# Patient Record
Sex: FEMALE | Race: WHITE | NOT HISPANIC OR LATINO | Employment: OTHER | URBAN - METROPOLITAN AREA
[De-identification: names, ages, dates, MRNs, and addresses within clinical notes are randomized per-mention and may not be internally consistent; named-entity substitution may affect disease eponyms.]

---

## 2017-06-26 ENCOUNTER — PATIENT OUTREACH (OUTPATIENT)
Dept: HEALTH INFORMATION MANAGEMENT | Facility: OTHER | Age: 82
End: 2017-06-26

## 2017-06-26 NOTE — PROGRESS NOTES
Outcome: WRONG NUMBER    WebIZ Checked & Epic Updated:  yes    [facility] Verified: yes    Attempt # 1ST

## 2017-07-06 NOTE — PROGRESS NOTES
Outcome: wrong number    WebIZ Checked & Epic Updated:  yes    [facility] Verified: yes    Attempt # 2nd

## 2017-09-12 ENCOUNTER — OFFICE VISIT (OUTPATIENT)
Dept: MEDICAL GROUP | Facility: PHYSICIAN GROUP | Age: 82
End: 2017-09-12
Payer: MEDICARE

## 2017-09-12 ENCOUNTER — TELEPHONE (OUTPATIENT)
Dept: MEDICAL GROUP | Facility: PHYSICIAN GROUP | Age: 82
End: 2017-09-12

## 2017-09-12 VITALS
HEART RATE: 75 BPM | OXYGEN SATURATION: 94 % | SYSTOLIC BLOOD PRESSURE: 120 MMHG | DIASTOLIC BLOOD PRESSURE: 72 MMHG | HEIGHT: 60 IN | BODY MASS INDEX: 30.94 KG/M2 | TEMPERATURE: 98.5 F | RESPIRATION RATE: 18 BRPM | WEIGHT: 157.6 LBS

## 2017-09-12 DIAGNOSIS — Z12.39 BREAST CANCER SCREENING: ICD-10-CM

## 2017-09-12 DIAGNOSIS — Z12.11 COLON CANCER SCREENING: ICD-10-CM

## 2017-09-12 DIAGNOSIS — R23.8 SKIN IRRITATION: ICD-10-CM

## 2017-09-12 DIAGNOSIS — E03.9 HYPOTHYROIDISM (ACQUIRED): ICD-10-CM

## 2017-09-12 DIAGNOSIS — E78.2 MIXED DYSLIPIDEMIA: ICD-10-CM

## 2017-09-12 PROCEDURE — 99214 OFFICE O/P EST MOD 30 MIN: CPT | Performed by: FAMILY MEDICINE

## 2017-09-12 RX ORDER — LIDOCAINE 40 MG/G
1 CREAM TOPICAL ONCE
Qty: 30 G | Refills: 2 | Status: SHIPPED | OUTPATIENT
Start: 2017-09-12 | End: 2017-09-12

## 2017-09-12 ASSESSMENT — PATIENT HEALTH QUESTIONNAIRE - PHQ9: CLINICAL INTERPRETATION OF PHQ2 SCORE: 0

## 2017-09-12 NOTE — ASSESSMENT & PLAN NOTE
2 weeks of intermittent burning pain around left nipple.   She had shingles in same area many years ago. 25 yrs ago  No change in intensity. She notices the small bumps around her nipple on the areola get inflamed, tender and then improve.     No blistering of skin  Will try a topical analgesic    if no imp will try gabapentin.   Has been improving.

## 2017-09-12 NOTE — PROGRESS NOTES
"Subjective:   Mela Obrien is a 82 y.o. female here today for evaluation and management of:     Mixed dyslipidemia  Recheck labs  Continue with  Healthy diet and exercise.   She has an [place] smallwood and a border collie 16 yrs old. She walks them twice a day.     Hypothyroidism (acquired)  Stable on levothyroxine 50 mg  TSH last year normal  Will recheck labs.       Skin irritation  2 weeks of intermittent burning pain around left nipple.   She had shingles in same area many years ago. 25 yrs ago  No change in intensity. She notices the small bumps around her nipple on the areola get inflamed, tender and then improve.     No blistering of skin  Will try a topical analgesic    if no imp will try gabapentin.            Current medicines (including changes today)  Current Outpatient Prescriptions   Medication Sig Dispense Refill   • lidocaine (LMX) 4 % Cream Apply 1 g to affected area(s) Once for 1 dose. 30 g 2   • aspirin 81 MG EC tablet Take  by mouth.     • levothyroxine (SYNTHROID) 50 MCG Tab Take 1 Tab by mouth Every morning on an empty stomach. 90 Tab 3   • Cholecalciferol (VITAMIN D) 2000 UNIT Tab Take 1 Tab by mouth every day. 90 Tab 0     No current facility-administered medications for this visit.      She  has a past medical history of Cataract and TIA (transient ischemic attack).    ROS  No chest pain, no shortness of breath, no abdominal pain       Objective:     Blood pressure 120/72, pulse 75, temperature 36.9 °C (98.5 °F), resp. rate 18, height 1.511 m (4' 11.5\"), weight 71.5 kg (157 lb 9.6 oz), SpO2 94 %. Body mass index is 31.3 kg/m².   Physical Exam:  Constitutional: Alert, no distress.  Skin: Warm, dry, good turgor, no rashes in visible areas. Mild erythema on skin around left nipple no ulceration, no blisters, mildly TTP.   Eye: Equal, round and reactive, conjunctiva clear, lids normal.  ENMT: Lips without lesions, good dentition, oropharynx clear.  Neck: Trachea midline, no masses, no " thyromegaly. No cervical or supraclavicular lymphadenopathy  Respiratory: Unlabored respiratory effort, lungs clear to auscultation, no wheezes, no ronchi.  Cardiovascular: Normal S1, S2, no murmur, no edema.  Abdomen: Soft, non-tender, no masses, no hepatosplenomegaly.  Psych: Alert and oriented x3, normal affect and mood.        Assessment and Plan:   The following treatment plan was discussed    1. Mixed dyslipidemia  Continue with healthy die and activity  Recheck labs  - LIPID PROFILE; Future    2. Hypothyroidism (acquired)  Stable, controlled on levothyroxine 50mcg  - TSH WITH REFLEX TO FT4; Future    3. Skin irritation  Trial topical lidocaine.   Check mammo,   Monitor closely for worsening symptoms.   - COMP METABOLIC PANEL; Future  - lidocaine (LMX) 4 % Cream; Apply 1 g to affected area(s) Once for 1 dose.  Dispense: 30 g; Refill: 2    4. Breast cancer screening  - MA-SCREEN MAMMO W/CAD-BILAT    5. Colon cancer screening  - OCCULT BLOOD FECES IMMUNOASSAY (FIT); Future      Followup: No Follow-up on file.

## 2017-09-12 NOTE — ASSESSMENT & PLAN NOTE
Recheck labs  Continue with  Healthy diet and exercise.   She has an [place] smallwood and a border collie 16 yrs old. She walks them twice a day.

## 2017-09-14 ENCOUNTER — HOSPITAL ENCOUNTER (OUTPATIENT)
Facility: MEDICAL CENTER | Age: 82
End: 2017-09-14
Attending: FAMILY MEDICINE
Payer: MEDICARE

## 2017-09-14 PROCEDURE — 82274 ASSAY TEST FOR BLOOD FECAL: CPT

## 2017-09-20 DIAGNOSIS — Z12.11 COLON CANCER SCREENING: ICD-10-CM

## 2017-09-20 LAB — HEMOCCULT STL QL IA: NEGATIVE

## 2017-10-10 ENCOUNTER — TELEPHONE (OUTPATIENT)
Dept: MEDICAL GROUP | Facility: PHYSICIAN GROUP | Age: 82
End: 2017-10-10

## 2017-10-12 ENCOUNTER — HOSPITAL ENCOUNTER (OUTPATIENT)
Dept: LAB | Facility: MEDICAL CENTER | Age: 82
End: 2017-10-12
Attending: FAMILY MEDICINE
Payer: MEDICARE

## 2017-10-12 DIAGNOSIS — E78.2 MIXED DYSLIPIDEMIA: ICD-10-CM

## 2017-10-12 DIAGNOSIS — E03.9 HYPOTHYROIDISM (ACQUIRED): ICD-10-CM

## 2017-10-12 DIAGNOSIS — R23.8 SKIN IRRITATION: ICD-10-CM

## 2017-10-12 LAB
ALBUMIN SERPL BCP-MCNC: 4 G/DL (ref 3.2–4.9)
ALBUMIN/GLOB SERPL: 1.4 G/DL
ALP SERPL-CCNC: 53 U/L (ref 30–99)
ALT SERPL-CCNC: 14 U/L (ref 2–50)
ANION GAP SERPL CALC-SCNC: 6 MMOL/L (ref 0–11.9)
AST SERPL-CCNC: 19 U/L (ref 12–45)
BILIRUB SERPL-MCNC: 0.4 MG/DL (ref 0.1–1.5)
BUN SERPL-MCNC: 18 MG/DL (ref 8–22)
CALCIUM SERPL-MCNC: 9 MG/DL (ref 8.5–10.5)
CHLORIDE SERPL-SCNC: 110 MMOL/L (ref 96–112)
CHOLEST SERPL-MCNC: 187 MG/DL (ref 100–199)
CO2 SERPL-SCNC: 28 MMOL/L (ref 20–33)
CREAT SERPL-MCNC: 0.83 MG/DL (ref 0.5–1.4)
GFR SERPL CREATININE-BSD FRML MDRD: >60 ML/MIN/1.73 M 2
GLOBULIN SER CALC-MCNC: 2.9 G/DL (ref 1.9–3.5)
GLUCOSE SERPL-MCNC: 76 MG/DL (ref 65–99)
HDLC SERPL-MCNC: 33 MG/DL
LDLC SERPL CALC-MCNC: 111 MG/DL
POTASSIUM SERPL-SCNC: 4.3 MMOL/L (ref 3.6–5.5)
PROT SERPL-MCNC: 6.9 G/DL (ref 6–8.2)
SODIUM SERPL-SCNC: 144 MMOL/L (ref 135–145)
T4 FREE SERPL-MCNC: 0.67 NG/DL (ref 0.53–1.43)
TRIGL SERPL-MCNC: 217 MG/DL (ref 0–149)
TSH SERPL DL<=0.005 MIU/L-ACNC: 14.13 UIU/ML (ref 0.3–3.7)

## 2017-10-12 PROCEDURE — 84443 ASSAY THYROID STIM HORMONE: CPT

## 2017-10-12 PROCEDURE — 80053 COMPREHEN METABOLIC PANEL: CPT

## 2017-10-12 PROCEDURE — 84439 ASSAY OF FREE THYROXINE: CPT

## 2017-10-12 PROCEDURE — 80061 LIPID PANEL: CPT

## 2017-10-12 PROCEDURE — 36415 COLL VENOUS BLD VENIPUNCTURE: CPT

## 2017-10-13 DIAGNOSIS — E03.9 HYPOTHYROIDISM: ICD-10-CM

## 2017-10-13 NOTE — TELEPHONE ENCOUNTER
Was the patient seen in the last year in this department? Yes     Does patient have an active prescription for medications requested? No     Received Request Via: Pharmacy      Pt met protocol?: Yes Last OV 09/2017  TSH   Date Value Ref Range Status   10/12/2017 14.130 (H) 0.300 - 3.700 uIU/mL Final

## 2017-10-16 RX ORDER — LEVOTHYROXINE SODIUM 0.05 MG/1
TABLET ORAL
Qty: 90 TAB | Refills: 1 | Status: SHIPPED | OUTPATIENT
Start: 2017-10-16 | End: 2018-04-10 | Stop reason: SDUPTHER

## 2017-10-16 NOTE — TELEPHONE ENCOUNTER
Please ask Mela if she has been taking her thyroid medication regularly, if so we need to increase the dose as TSH is elevated. I've done a refill on levothyroxine 50 mcg.   If she had run out of the meds needs to stay on the same dose 50 mcg and then recheck lab in 2 months.   I need to know whether she was on the levothyroxine 50 mcg regularly when the lab was done on October 12th.   Thank you.   Eleni Garcia M.D.

## 2017-10-17 ENCOUNTER — TELEPHONE (OUTPATIENT)
Dept: MEDICAL GROUP | Facility: PHYSICIAN GROUP | Age: 82
End: 2017-10-17

## 2017-10-17 NOTE — TELEPHONE ENCOUNTER
----- Message from Yris Swift sent at 10/16/2017  4:08 PM PDT -----  Regarding: Refill on Thyroid Medication   Contact: [phone]  Patient called in stating that she ran out of her Thyroid medication last Friday and would like a refill. Please call patient to advise.    Thank you

## 2017-11-01 ENCOUNTER — OFFICE VISIT (OUTPATIENT)
Dept: MEDICAL GROUP | Facility: PHYSICIAN GROUP | Age: 82
End: 2017-11-01
Payer: MEDICARE

## 2017-11-01 VITALS
BODY MASS INDEX: 31.41 KG/M2 | SYSTOLIC BLOOD PRESSURE: 132 MMHG | RESPIRATION RATE: 20 BRPM | OXYGEN SATURATION: 95 % | HEART RATE: 76 BPM | WEIGHT: 155.8 LBS | HEIGHT: 59 IN | DIASTOLIC BLOOD PRESSURE: 72 MMHG | TEMPERATURE: 98.1 F

## 2017-11-01 DIAGNOSIS — L57.0 ACTINIC KERATOSIS DUE TO EXPOSURE TO SUNLIGHT: ICD-10-CM

## 2017-11-01 DIAGNOSIS — R23.8 SKIN IRRITATION: ICD-10-CM

## 2017-11-01 DIAGNOSIS — E78.2 MIXED DYSLIPIDEMIA: ICD-10-CM

## 2017-11-01 DIAGNOSIS — E03.9 HYPOTHYROIDISM (ACQUIRED): ICD-10-CM

## 2017-11-01 PROCEDURE — G0439 PPPS, SUBSEQ VISIT: HCPCS | Performed by: FAMILY MEDICINE

## 2017-11-01 NOTE — ASSESSMENT & PLAN NOTE
TSH is elevated she is on levothyroxine 50 mcg. She was out of medication for 11 days. Will just recheck labs with no dose changes.

## 2017-11-01 NOTE — ASSESSMENT & PLAN NOTE
No changes in lipid panel  She has never had a heart attack or a stroke, had a TIA 35 years ago. She has never been on a statin  She does not smoke.   TC is normal LDL mildly elevated at 111 with no changes over one year.   Patient is 82 years old. Advised on diet changes will monitor, will not start statin now.

## 2017-11-03 NOTE — PROGRESS NOTES
"Subjective:   Mela Obrien is a 82 y.o. female here today for evaluation and management of:     Actinic keratosis due to exposure to sunlight  No skin changes.     Skin irritation  The irritation around her nipple resolved with OTC ointment.   Mammogram this year is normal.     Mixed dyslipidemia  No changes in lipid panel  She has never had a heart attack or a stroke, had a TIA 35 years ago. She has never been on a statin  She does not smoke.   TC is normal LDL mildly elevated at 111 with no changes over one year.   Patient is 82 years old. Advised on diet changes will monitor, will not start statin now.     Hypothyroidism (acquired)  TSH is elevated she is on levothyroxine 50 mcg. She was out of medication for 11 days. Will just recheck labs with no dose changes.            Current medicines (including changes today)  Current Outpatient Prescriptions   Medication Sig Dispense Refill   • aspirin 81 MG EC tablet Take  by mouth.     • levothyroxine (SYNTHROID) 50 MCG Tab TAKE ONE TABLET BY MOUTH ONCE DAILY IN THE MORNING ON AN EMPTY STOMACH 90 Tab 1     No current facility-administered medications for this visit.      She  has a past medical history of Cataract and TIA (transient ischemic attack).    ROS  No chest pain, no shortness of breath, no abdominal pain       Objective:     Blood pressure 132/72, pulse 76, temperature 36.7 °C (98.1 °F), resp. rate 20, height 1.499 m (4' 11\"), weight 70.7 kg (155 lb 12.8 oz), SpO2 95 %. Body mass index is 31.47 kg/m².   Physical Exam:  Constitutional: Alert, no distress.  Skin: Warm, dry, good turgor, no rashes in visible areas.  Eye: Equal, round and reactive, conjunctiva clear, lids normal.  ENMT: Lips without lesions, good dentition, oropharynx clear.  Neck: Trachea midline, no masses, no thyromegaly. No cervical or supraclavicular lymphadenopathy  Respiratory: Unlabored respiratory effort, lungs clear to auscultation, no wheezes, no ronchi.  Cardiovascular: Normal S1, " S2, no murmur, no edema.  Abdomen: Soft, non-tender, no masses, no hepatosplenomegaly.  Psych: Alert and oriented x3, normal affect and mood.        Assessment and Plan:   The following treatment plan was discussed    1. Actinic keratosis due to exposure to sunlight  No skin change, continue to monitor.     2. Skin irritation  Resolved.     3. Mixed dyslipidemia  Stable. Continue with diet management.     4. Hypothyroidism (acquired)  TSH elevated but she was out of medication. Continue 55mcg and recheck lab.   - TSH WITH REFLEX TO FT4; Future      Followup: Return in about 6 months (around 5/1/2018) for abnormal TSH, follow up labs. .

## 2018-04-10 RX ORDER — LEVOTHYROXINE SODIUM 0.05 MG/1
TABLET ORAL
Qty: 90 TAB | Refills: 1 | Status: SHIPPED | OUTPATIENT
Start: 2018-04-10 | End: 2018-10-01 | Stop reason: SDUPTHER

## 2018-04-25 ENCOUNTER — HOSPITAL ENCOUNTER (OUTPATIENT)
Dept: LAB | Facility: MEDICAL CENTER | Age: 83
End: 2018-04-25
Attending: FAMILY MEDICINE
Payer: MEDICARE

## 2018-04-25 DIAGNOSIS — E03.9 HYPOTHYROIDISM (ACQUIRED): ICD-10-CM

## 2018-04-25 LAB — TSH SERPL DL<=0.005 MIU/L-ACNC: 3.53 UIU/ML (ref 0.38–5.33)

## 2018-04-25 PROCEDURE — 36415 COLL VENOUS BLD VENIPUNCTURE: CPT

## 2018-04-25 PROCEDURE — 84443 ASSAY THYROID STIM HORMONE: CPT

## 2018-05-30 NOTE — PROGRESS NOTES
1. Attempt #: 1    2. [facility] Verified: yes    3. Verify PCP: yes    4. Care Team Updated:       •   DME Company (gait device, O2, CPAP, etc.): YES       •   Other Specialists (eye doctor, derm, GYN, cardiology, endo, etc): YES    5.  Reviewed/Updated the following with patient:       •   Communication Preference Obtained? YES       •   Preferred Pharmacy? YES       •   Preferred Lab? YES       •   Family History (document living status of immediate family members and if + hx of cancer, diabetes, hypertension, hyperlipidemia, heart attack, stroke) YES. Was Abstract Encounter opened and chart updated? YES    6. [facility] [employer] Activation: declined    7. [facility] Deandre: no    8. Annual Wellness Visit Scheduling  Scheduling Status:Scheduled      9. Care Gap Scheduling (Attempt to Schedule EACH Overdue Care Gap!)     Health Maintenance Due   Topic Date Due   • Annual Wellness Visit  1935   • IMM DTaP/Tdap/Td Vaccine (1 - Tdap) 07/30/1956   • PAP SMEAR  07/30/1958   • COLONOSCOPY  07/30/1987   • BONE DENSITY  07/30/2002   • IMM PNEUMOCOCCAL 65+ (ADULT) LOW/MEDIUM RISK SERIES (1 of 2 - PCV13) 07/30/2002        Scheduled patient for Annual Wellness Visit    10. Patient was advised: “This is a free wellness visit. The provider will screen for medical conditions to help you stay healthy. If you have other concerns to address you may be asked to discuss these at a separate visit or there may be an additional fee.”     11. Patient was informed to arrive 15 min prior to their scheduled appointment and bring in their medication bottles.

## 2018-10-01 ENCOUNTER — OFFICE VISIT (OUTPATIENT)
Dept: MEDICAL GROUP | Facility: PHYSICIAN GROUP | Age: 83
End: 2018-10-01
Payer: MEDICARE

## 2018-10-01 ENCOUNTER — APPOINTMENT (OUTPATIENT)
Dept: MEDICAL GROUP | Facility: PHYSICIAN GROUP | Age: 83
End: 2018-10-01
Payer: MEDICARE

## 2018-10-01 VITALS
BODY MASS INDEX: 31.65 KG/M2 | WEIGHT: 157 LBS | SYSTOLIC BLOOD PRESSURE: 128 MMHG | RESPIRATION RATE: 16 BRPM | HEIGHT: 59 IN | DIASTOLIC BLOOD PRESSURE: 78 MMHG | HEART RATE: 81 BPM | TEMPERATURE: 97.3 F | OXYGEN SATURATION: 97 %

## 2018-10-01 DIAGNOSIS — E03.9 HYPOTHYROIDISM (ACQUIRED): ICD-10-CM

## 2018-10-01 DIAGNOSIS — E78.2 MIXED DYSLIPIDEMIA: ICD-10-CM

## 2018-10-01 DIAGNOSIS — E66.9 OBESITY (BMI 30-39.9): ICD-10-CM

## 2018-10-01 PROBLEM — R23.8 SKIN IRRITATION: Status: RESOLVED | Noted: 2017-09-12 | Resolved: 2018-10-01

## 2018-10-01 PROCEDURE — 99213 OFFICE O/P EST LOW 20 MIN: CPT | Performed by: FAMILY MEDICINE

## 2018-10-01 RX ORDER — LEVOTHYROXINE SODIUM 0.05 MG/1
TABLET ORAL
Qty: 90 TAB | Refills: 3 | Status: SHIPPED | OUTPATIENT
Start: 2018-10-01

## 2018-10-01 NOTE — PROGRESS NOTES
"Subjective:   Mela Obrien is a 83 y.o. female here today for evaluation and management of:     Hypothyroidism (acquired)  Stable on levothyroxine 50 mcg  Refills done  Lab recheck in April  Has no significant weight changes, skin changes.    Mixed dyslipidemia  Chronic condition, due for recheck on labs  Not on a statin.            Current medicines (including changes today)  Current Outpatient Prescriptions   Medication Sig Dispense Refill   • levothyroxine (SYNTHROID) 50 MCG Tab TAKE ONE TABLET BY MOUTH ONCE DAILY IN THE MORNING ON AN EMPTY STOMACH 90 Tab 1   • aspirin 81 MG EC tablet Take  by mouth.       No current facility-administered medications for this visit.      She  has a past medical history of Cataract and TIA (transient ischemic attack).    ROS  No chest pain, no shortness of breath, no abdominal pain       Objective:     Blood pressure 128/78, pulse 81, temperature 36.3 °C (97.3 °F), temperature source Temporal, resp. rate 16, height 1.499 m (4' 11\"), weight 71.2 kg (157 lb), SpO2 97 %. Body mass index is 31.71 kg/m².   Physical Exam:  Constitutional: Alert, no distress.  Skin: Warm, dry, good turgor, no rashes in visible areas.  Eye: Equal, round and reactive, conjunctiva clear, lids normal.  ENMT: Lips without lesions, good dentition, oropharynx clear.  Neck: Trachea midline, no masses, no thyromegaly. No cervical or supraclavicular lymphadenopathy  Respiratory: Unlabored respiratory effort, lungs clear to auscultation, no wheezes, no ronchi.  Cardiovascular: Normal S1, S2, no murmur, no edema.  Abdomen: Soft, non-tender, no masses, no hepatosplenomegaly.  Psych: Alert and oriented x3, normal affect and mood.        Assessment and Plan:   The following treatment plan was discussed    1. Hypothyroidism (acquired)  Stable.   - COMP METABOLIC PANEL; Future  - TSH WITH REFLEX TO FT4; Future    2. Mixed dyslipidemia  Due for recheck on labs.   - LIPID PROFILE; Future  - COMP METABOLIC PANEL; " Future    3. Obesity (BMI 30-39.9)  - Patient identified as having weight management issue.  Appropriate orders and counseling given.  - COMP METABOLIC PANEL; Future      Followup: Return in about 1 year (around 10/1/2019) for hypothyroidism, labs in April.

## 2018-10-01 NOTE — ASSESSMENT & PLAN NOTE
Stable on levothyroxine 50 mcg  Refills done  Lab recheck in April  Has no significant weight changes, skin changes.

## 2021-01-11 DIAGNOSIS — Z23 NEED FOR VACCINATION: ICD-10-CM
